# Patient Record
Sex: FEMALE | Race: WHITE | NOT HISPANIC OR LATINO
[De-identification: names, ages, dates, MRNs, and addresses within clinical notes are randomized per-mention and may not be internally consistent; named-entity substitution may affect disease eponyms.]

---

## 2019-07-24 ENCOUNTER — RECORD ABSTRACTING (OUTPATIENT)
Age: 34
End: 2019-07-24

## 2019-07-24 DIAGNOSIS — Z78.9 OTHER SPECIFIED HEALTH STATUS: ICD-10-CM

## 2019-07-24 DIAGNOSIS — Z82.49 FAMILY HISTORY OF ISCHEMIC HEART DISEASE AND OTHER DISEASES OF THE CIRCULATORY SYSTEM: ICD-10-CM

## 2019-07-24 DIAGNOSIS — Z86.59 PERSONAL HISTORY OF OTHER MENTAL AND BEHAVIORAL DISORDERS: ICD-10-CM

## 2019-07-24 PROBLEM — Z00.00 ENCOUNTER FOR PREVENTIVE HEALTH EXAMINATION: Status: ACTIVE | Noted: 2019-07-24

## 2020-12-04 ENCOUNTER — APPOINTMENT (OUTPATIENT)
Dept: OTOLARYNGOLOGY | Facility: CLINIC | Age: 35
End: 2020-12-04
Payer: COMMERCIAL

## 2020-12-04 VITALS
TEMPERATURE: 98 F | SYSTOLIC BLOOD PRESSURE: 134 MMHG | DIASTOLIC BLOOD PRESSURE: 88 MMHG | WEIGHT: 125 LBS | HEIGHT: 62 IN | BODY MASS INDEX: 23 KG/M2

## 2020-12-04 PROCEDURE — 99072 ADDL SUPL MATRL&STAF TM PHE: CPT

## 2020-12-04 PROCEDURE — 99214 OFFICE O/P EST MOD 30 MIN: CPT

## 2020-12-04 RX ORDER — ALPRAZOLAM 0.25 MG/1
0.25 TABLET ORAL
Refills: 0 | Status: COMPLETED | COMMUNITY
End: 2020-12-04

## 2020-12-04 NOTE — HISTORY OF PRESENT ILLNESS
[de-identified] : recurrent sinus pressure and congestion\par hx of allergic rhinits\par 2 or 3 times a year sinusitis\par no fever

## 2020-12-04 NOTE — REVIEW OF SYSTEMS
[Sneezing] : sneezing [Seasonal Allergies] : seasonal allergies [Post Nasal Drip] : post nasal drip [Ear Pain] : ear pain [Ear Itch] : ear itch [Nasal Congestion] : nasal congestion [Recurrent Sinus Infections] : recurrent sinus infections [Sinus Pain] : sinus pain [Sinus Pressure] : sinus pressure [Throat Clearing] : throat clearing [Anxiety] : anxiety [Patient Intake Form Reviewed] : Patient intake form was reviewed [de-identified] : headache

## 2020-12-04 NOTE — ASSESSMENT
[FreeTextEntry1] : symptoms related to mild sinusitis\par deviated septum and allergy aggravating factor\par augmentin\par dymista\par medrol dose pack\par will consider ct sinus\par f/u one month

## 2020-12-04 NOTE — PHYSICAL EXAM
[de-identified] : mild rhinits [Normal] : mucosa is normal [Midline] : trachea located in midline position

## 2021-03-07 ENCOUNTER — RX RENEWAL (OUTPATIENT)
Age: 36
End: 2021-03-07

## 2021-03-16 ENCOUNTER — APPOINTMENT (OUTPATIENT)
Dept: OTOLARYNGOLOGY | Facility: CLINIC | Age: 36
End: 2021-03-16
Payer: COMMERCIAL

## 2021-03-16 VITALS
DIASTOLIC BLOOD PRESSURE: 82 MMHG | SYSTOLIC BLOOD PRESSURE: 126 MMHG | TEMPERATURE: 98.1 F | HEART RATE: 80 BPM | BODY MASS INDEX: 23 KG/M2 | WEIGHT: 125 LBS | HEIGHT: 62 IN

## 2021-03-16 DIAGNOSIS — H10.9 UNSPECIFIED CONJUNCTIVITIS: ICD-10-CM

## 2021-03-16 PROCEDURE — 99213 OFFICE O/P EST LOW 20 MIN: CPT

## 2021-03-16 PROCEDURE — 99072 ADDL SUPL MATRL&STAF TM PHE: CPT

## 2021-03-16 NOTE — HISTORY OF PRESENT ILLNESS
[de-identified] : recurrent sinus pressure and congestion\par hx of allergic rhinits\par 2 or 3 times a year sinusitis\par no fever

## 2021-03-16 NOTE — PHYSICAL EXAM
[de-identified] : mild rhinits [Normal] : mucosa is normal [Midline] : trachea located in midline position

## 2021-03-16 NOTE — ASSESSMENT
[FreeTextEntry1] : HUMIDIFIER\par ZYRTEC\par IF SYMPTOMS INCREASE CONSIDER CEFDINIR\par MEDROL DOSE PACK\par F/U 2 MONTHS\par PT SAYS HAS HAD MULTIPLE CT SINUS IN THE PAST WITH MINIMAL INFLAMMATION\par F/U ALLERGY/ IMMUNOLOGY

## 2021-04-14 ENCOUNTER — APPOINTMENT (OUTPATIENT)
Dept: OTOLARYNGOLOGY | Facility: CLINIC | Age: 36
End: 2021-04-14

## 2021-06-09 ENCOUNTER — RX RENEWAL (OUTPATIENT)
Age: 36
End: 2021-06-09

## 2021-07-02 ENCOUNTER — RX RENEWAL (OUTPATIENT)
Age: 36
End: 2021-07-02

## 2021-07-26 ENCOUNTER — RX RENEWAL (OUTPATIENT)
Age: 36
End: 2021-07-26

## 2021-07-26 ENCOUNTER — APPOINTMENT (OUTPATIENT)
Dept: PEDIATRIC ALLERGY IMMUNOLOGY | Facility: CLINIC | Age: 36
End: 2021-07-26
Payer: COMMERCIAL

## 2021-07-26 PROCEDURE — 95004 PERQ TESTS W/ALRGNC XTRCS: CPT

## 2021-07-26 PROCEDURE — 99204 OFFICE O/P NEW MOD 45 MIN: CPT | Mod: 25

## 2021-07-26 PROCEDURE — 99072 ADDL SUPL MATRL&STAF TM PHE: CPT

## 2021-07-26 RX ORDER — METHYLPREDNISOLONE 4 MG/1
4 TABLET ORAL
Qty: 1 | Refills: 1 | Status: DISCONTINUED | COMMUNITY
Start: 2021-03-16 | End: 2021-07-26

## 2021-07-26 RX ORDER — ESCITALOPRAM OXALATE 5 MG/1
TABLET, FILM COATED ORAL
Refills: 0 | Status: ACTIVE | COMMUNITY

## 2021-07-26 RX ORDER — AMOXICILLIN AND CLAVULANATE POTASSIUM 875; 125 MG/1; MG/1
875-125 TABLET, COATED ORAL
Qty: 14 | Refills: 0 | Status: DISCONTINUED | COMMUNITY
Start: 2020-12-04 | End: 2021-07-26

## 2021-07-26 RX ORDER — ESTRADIOL VALERATE AND ESTRADIOL VALERATE/DIENOGEST 3-2-1(28)
3/2-2/2-3/1 KIT ORAL
Refills: 0 | Status: ACTIVE | COMMUNITY

## 2021-07-26 RX ORDER — CEFDINIR 300 MG/1
300 CAPSULE ORAL
Qty: 10 | Refills: 1 | Status: DISCONTINUED | COMMUNITY
Start: 2021-03-16 | End: 2021-07-26

## 2021-07-26 RX ORDER — ALBUTEROL SULFATE 108 UG/1
108 (90 BASE) AEROSOL, METERED RESPIRATORY (INHALATION)
Refills: 0 | Status: ACTIVE | COMMUNITY

## 2021-07-26 RX ORDER — METHYLPREDNISOLONE 4 MG/1
4 TABLET ORAL
Qty: 1 | Refills: 1 | Status: DISCONTINUED | COMMUNITY
Start: 2020-12-04 | End: 2021-07-26

## 2021-07-26 NOTE — REVIEW OF SYSTEMS
[Eye Redness] : redness [Puffy Eyelids] : puffy ~T eyelids [Swollen Eyelids] : ~T ~L swollen eyelids [Rhinorrhea] : rhinorrhea [Nasal Congestion] : nasal congestion [Post Nasal Drip] : post nasal drip [Sneezing] : sneezing [Recurrent Sinus Infections] : recurrent sinus infections [Recurrent Throat Infections] : no recurrence of throat infections [Recurrent Bronchitis] : no recurrent bronchitis [Recurrent Ear Infections] : no recurrence or ear infections [Recurrent Skin Infections] : no recurrent skin infections [Recurrent Pneumonia] : no ~T recurrent pneumonia [Nl] : Integumentary

## 2021-07-26 NOTE — REASON FOR VISIT
[Initial Evaluation] : an initial evaluation of [Allergy Evaluation/ Skin Testing] : allergy evaluation and or skin testing [Congestion] : congestion [Itchy Eyes] : itchy eyes [Red Eyes] : red eyes [Asthma] : asthma

## 2021-07-26 NOTE — SOCIAL HISTORY
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

## 2021-07-26 NOTE — PHYSICAL EXAM
[Alert] : alert [Well Nourished] : well nourished [Normal TMs] : both tympanic membranes were normal [Pale mucosa] : no pale mucosa [Boggy Nasal Turbinates] : no boggy and/or pale nasal turbinates [Pharyngeal erythema] : no pharyngeal erythema [Posterior Pharyngeal Cobblestoning] : no posterior pharyngeal cobblestoning [Clear Rhinorrhea] : no clear rhinorrhea was seen [No Neck Mass] : no neck mass was observed [Normal Rate and Effort] : normal respiratory rhythm and effort [Wheezing] : no wheezing was heard [Normal Rate] : heart rate was normal  [Normal S1, S2] : normal S1 and S2 [Normal Cervical Lymph Nodes] : cervical [Skin Intact] : skin intact

## 2021-07-26 NOTE — ASSESSMENT
[FreeTextEntry1] : 35 yr old with long history of CRS, AR s/p AIT now with slowly increasing compalints of oral steroid and antibiotic use\par \par Skin test today show only positive test to mite\par Pt would want to consider IT but insurance co pay is too high\par \par Will re-send immunological eval and consider repeat Pneumovax if titers low\par Will have to review old recored\par \par Follow up fall 21.\par \par Total MD time spent on this encounter was 45-59 minutes.  This includes time devoted to preparing to see the patient with review of previous medical record, obtaining medical history, performing physical exam, counseling and patient education with patient and family, ordering medications and lab studies, documentation in the medical record and coordination of care.\par

## 2021-07-28 ENCOUNTER — NON-APPOINTMENT (OUTPATIENT)
Age: 36
End: 2021-07-28

## 2021-08-24 ENCOUNTER — APPOINTMENT (OUTPATIENT)
Dept: OTOLARYNGOLOGY | Facility: CLINIC | Age: 36
End: 2021-08-24
Payer: COMMERCIAL

## 2021-08-24 VITALS
HEART RATE: 83 BPM | BODY MASS INDEX: 22.08 KG/M2 | HEIGHT: 62 IN | SYSTOLIC BLOOD PRESSURE: 125 MMHG | DIASTOLIC BLOOD PRESSURE: 79 MMHG | WEIGHT: 120 LBS

## 2021-08-24 DIAGNOSIS — R42 DIZZINESS AND GIDDINESS: ICD-10-CM

## 2021-08-24 PROCEDURE — 99214 OFFICE O/P EST MOD 30 MIN: CPT

## 2021-08-24 PROCEDURE — 92557 COMPREHENSIVE HEARING TEST: CPT

## 2021-08-24 PROCEDURE — 92570 ACOUSTIC IMMITANCE TESTING: CPT

## 2021-08-24 RX ORDER — AZELASTINE HYDROCHLORIDE AND FLUTICASONE PROPIONATE 137; 50 UG/1; UG/1
137-50 SPRAY, METERED NASAL
Qty: 1 | Refills: 2 | Status: DISCONTINUED | COMMUNITY
Start: 2020-12-04 | End: 2021-08-24

## 2021-08-24 NOTE — REVIEW OF SYSTEMS
[Seasonal Allergies] : seasonal allergies [As Noted in HPI] : as noted in HPI [Negative] : Head and Neck

## 2021-08-24 NOTE — DATA REVIEWED
[de-identified] : C/o: episode of vertigo\par -Type A tymps AU\par -Ipsilateral acoustic reflexes present .5-2kHz (acoustic reflex decay neg at 1kHz) AU\par -Results obtained via insert earphones revealed hearing -8000 Hz, AU\par  Rec: 1)ENT f/u 2)Re-eval/ further testing as per MD

## 2021-08-24 NOTE — HISTORY OF PRESENT ILLNESS
[de-identified] : 35 yr old female w episode of vertigo 8/21/2021 when lying in bed, rolling to her right.  Lasted less than a minute. Nauseous most of that day.  Vertigo hasn't recurred, but she still feels "off"\par -aural fullness, tinnitus\par +childhood hx of otitis s/p BMT twice\par -noise exp\par +head traum w LOC when young (sports injuries)\par +FH mother vertigo\par

## 2021-08-24 NOTE — PHYSICAL EXAM
[Midline] : trachea located in midline position [Normal] : no nystagmus [] : Tandem Romberg test is negative

## 2021-08-25 ENCOUNTER — RX CHANGE (OUTPATIENT)
Age: 36
End: 2021-08-25

## 2021-08-25 RX ORDER — HYDROXYZINE HYDROCHLORIDE 25 MG/1
25 TABLET ORAL
Qty: 60 | Refills: 1 | Status: DISCONTINUED | COMMUNITY
Start: 2021-07-26 | End: 2021-08-25

## 2021-09-09 ENCOUNTER — RX RENEWAL (OUTPATIENT)
Age: 36
End: 2021-09-09

## 2021-10-03 ENCOUNTER — RX CHANGE (OUTPATIENT)
Age: 36
End: 2021-10-03

## 2021-10-03 RX ORDER — HYDROXYZINE HYDROCHLORIDE 25 MG/1
25 TABLET ORAL
Qty: 60 | Refills: 1 | Status: DISCONTINUED | COMMUNITY
Start: 2021-08-25 | End: 2021-10-03

## 2021-10-18 ENCOUNTER — RX CHANGE (OUTPATIENT)
Age: 36
End: 2021-10-18

## 2021-10-18 RX ORDER — HYDROXYZINE HYDROCHLORIDE 25 MG/1
25 TABLET ORAL
Qty: 60 | Refills: 1 | Status: DISCONTINUED | COMMUNITY
Start: 2021-10-03 | End: 2021-10-18

## 2021-11-02 ENCOUNTER — RX CHANGE (OUTPATIENT)
Age: 36
End: 2021-11-02

## 2021-11-02 RX ORDER — HYDROXYZINE HYDROCHLORIDE 25 MG/1
25 TABLET ORAL
Qty: 120 | Refills: 1 | Status: DISCONTINUED | COMMUNITY
Start: 2021-10-18 | End: 2021-11-02

## 2021-11-30 ENCOUNTER — RX RENEWAL (OUTPATIENT)
Age: 36
End: 2021-11-30

## 2022-01-14 ENCOUNTER — APPOINTMENT (OUTPATIENT)
Dept: OTOLARYNGOLOGY | Facility: CLINIC | Age: 37
End: 2022-01-14
Payer: COMMERCIAL

## 2022-01-14 VITALS
SYSTOLIC BLOOD PRESSURE: 127 MMHG | WEIGHT: 120 LBS | BODY MASS INDEX: 22.08 KG/M2 | HEART RATE: 78 BPM | DIASTOLIC BLOOD PRESSURE: 78 MMHG | HEIGHT: 62 IN

## 2022-01-14 DIAGNOSIS — J32.9 CHRONIC SINUSITIS, UNSPECIFIED: ICD-10-CM

## 2022-01-14 PROCEDURE — 99213 OFFICE O/P EST LOW 20 MIN: CPT

## 2022-01-14 NOTE — HISTORY OF PRESENT ILLNESS
[de-identified] : recurrent sinus pressure and congestion/ FEELING CONGESTED\par hx of allergic rhinits\par 2 or 3 times a year sinusitis\par no fever

## 2022-01-14 NOTE — PHYSICAL EXAM
[de-identified] : mild rhinits [Normal] : mucosa is normal [Midline] : trachea located in midline position

## 2022-01-18 ENCOUNTER — RX CHANGE (OUTPATIENT)
Age: 37
End: 2022-01-18

## 2022-01-18 RX ORDER — HYDROXYZINE HYDROCHLORIDE 25 MG/1
25 TABLET ORAL
Qty: 120 | Refills: 1 | Status: DISCONTINUED | COMMUNITY
Start: 2021-11-02 | End: 2022-01-18

## 2022-01-25 ENCOUNTER — RX RENEWAL (OUTPATIENT)
Age: 37
End: 2022-01-25

## 2022-02-14 ENCOUNTER — APPOINTMENT (OUTPATIENT)
Dept: PEDIATRIC ALLERGY IMMUNOLOGY | Facility: CLINIC | Age: 37
End: 2022-02-14
Payer: COMMERCIAL

## 2022-02-14 VITALS
WEIGHT: 137 LBS | DIASTOLIC BLOOD PRESSURE: 80 MMHG | TEMPERATURE: 97.5 F | RESPIRATION RATE: 16 BRPM | HEIGHT: 62 IN | BODY MASS INDEX: 25.21 KG/M2 | OXYGEN SATURATION: 99 % | HEART RATE: 72 BPM | SYSTOLIC BLOOD PRESSURE: 122 MMHG

## 2022-02-14 DIAGNOSIS — J31.0 CHRONIC RHINITIS: ICD-10-CM

## 2022-02-14 DIAGNOSIS — J45.909 UNSPECIFIED ASTHMA, UNCOMPLICATED: ICD-10-CM

## 2022-02-14 PROCEDURE — 99214 OFFICE O/P EST MOD 30 MIN: CPT

## 2022-02-14 RX ORDER — IPRATROPIUM BROMIDE 42 UG/1
0.06 SPRAY NASAL 3 TIMES DAILY
Qty: 1 | Refills: 5 | Status: DISCONTINUED | COMMUNITY
Start: 2021-07-26 | End: 2022-02-14

## 2022-02-14 RX ORDER — AMOXICILLIN AND CLAVULANATE POTASSIUM 875; 125 MG/1; MG/1
875-125 TABLET, COATED ORAL
Qty: 14 | Refills: 0 | Status: DISCONTINUED | COMMUNITY
Start: 2022-01-14 | End: 2022-02-14

## 2022-02-14 RX ORDER — METHYLPREDNISOLONE 4 MG/1
4 TABLET ORAL
Qty: 1 | Refills: 0 | Status: DISCONTINUED | COMMUNITY
Start: 2022-01-14 | End: 2022-02-14

## 2022-02-14 NOTE — ASSESSMENT
[FreeTextEntry1] : 36 yr old with recurrent sinusitis, AR to mite, mild intermittent asthma and anxiety\par \par Pt wants to consider IT to mite but is worried about cost\par Recent sinus CT not done secondary to cost issues - ?? candidate for surgical intervention\par Pt may be a candidate for repeat Pneumovax 23 as it previously helped reduce infection rate but pt is not sure when last given - will let me know\par Will add AM zyrtec 10 mg q AM along with Atarax 25 mg qhs\par Continue nasal spray combo\par Will refill all meds\par \par Total MD time spent on this encounter was 30 minutes.  This includes time devoted to preparing to see the patient with review of previous medical record, obtaining medical history, performing physical exam, counseling and patient education with patient and family, ordering medications and lab studies, documentation in the medical record and coordination of care.\par \par \par

## 2022-02-14 NOTE — REVIEW OF SYSTEMS
[Rhinorrhea] : rhinorrhea [Nasal Dryness] : dryness of the nose [Nasal Congestion] : nasal congestion [Nasal Itching] : nasal itching [Post Nasal Drip] : post nasal drip [Sneezing] : sneezing [Recurrent Sinus Infections] : recurrent sinus infections [Recurrent Throat Infections] : no recurrence of throat infections [Recurrent Bronchitis] : no recurrent bronchitis [Recurrent Ear Infections] : no recurrence or ear infections [Recurrent Skin Infections] : no recurrent skin infections [Nl] : Integumentary

## 2022-02-14 NOTE — HISTORY OF PRESENT ILLNESS
[de-identified] : 36 yr old with long history of AR and CRS - was followed by Dr. Lara and Dr. Prieto - now seeking to switch allergy care. Pt was on IT as teen but stopped and college and over the years she has noted increase complaints of nasal congestions, post nasal drip, itchy watery eyes and rhinitis. Sinus infections are noted 3x/year and treated with antibiotics and steroids. \par She uses Atarax 25 mg qhs, Zyrtec PRN, ipratropium 2s bid and combo of azelastine and fluticasone qd-bid.  All of these seem to help. She is interested in starting IT again but her insurance co pay is too high to make it manageable. \par \par She had an immunological eval years ago with Dr. Lara and was given Pneumococcal vaccine but does not remember when. She also had nasal endoscopies with Dr. Prieto but does not remember the last one along with several sinus CT scans but none recently. She recalls the CT scans being normal. \par \par She is now here for follow up - she given an interim history of at least 2-3 episodes of sinusitis/year treated with oral antibx and oral steroids.  She has not had a recent CT scan and does not want to secondary to cost.  She was on shots from age [age] years and wants to consider but states she cannot afford the co-pay.  Last ST 7/11 was positive only for mite\par Pt recently had immunology eval repeated - Immunocaps were positive only for mite. IgG and IgA were normal with slightly decreased IgM. Tetanus titers were normal. [facility] only did Pneumococcal 14 serotypes and some were elevated and some were decrease. She does recall Dr. Lara giving her a Pneumovax (got at [facility]) but does not remember when. She felt it had helped decrease the number of infections but it may have been over 5 years ago. She is currently suing Atarax qhs but does not take a day time H1 blocker and use Flonase and azelastine nasal spray separately

## 2022-02-14 NOTE — SOCIAL HISTORY
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

## 2022-02-14 NOTE — REASON FOR VISIT
[Routine Follow-Up] : a routine follow-up visit for [Congestion] : congestion [Itchy Eyes] : itchy eyes [Red Eyes] : red eyes [Asthma] : asthma

## 2022-02-16 ENCOUNTER — RX RENEWAL (OUTPATIENT)
Age: 37
End: 2022-02-16

## 2022-03-16 ENCOUNTER — RX CHANGE (OUTPATIENT)
Age: 37
End: 2022-03-16

## 2022-03-16 RX ORDER — HYDROXYZINE HYDROCHLORIDE 25 MG/1
25 TABLET ORAL
Qty: 120 | Refills: 1 | Status: DISCONTINUED | COMMUNITY
Start: 2022-01-18 | End: 2022-03-16

## 2022-03-28 ENCOUNTER — RX RENEWAL (OUTPATIENT)
Age: 37
End: 2022-03-28

## 2022-05-26 ENCOUNTER — RX CHANGE (OUTPATIENT)
Age: 37
End: 2022-05-26

## 2022-05-26 RX ORDER — HYDROXYZINE HYDROCHLORIDE 25 MG/1
25 TABLET ORAL
Qty: 120 | Refills: 1 | Status: DISCONTINUED | COMMUNITY
Start: 2022-03-16 | End: 2022-05-26

## 2022-05-27 ENCOUNTER — RX RENEWAL (OUTPATIENT)
Age: 37
End: 2022-05-27

## 2022-06-08 ENCOUNTER — RX CHANGE (OUTPATIENT)
Age: 37
End: 2022-06-08

## 2022-06-08 RX ORDER — HYDROXYZINE HYDROCHLORIDE 25 MG/1
25 TABLET ORAL
Qty: 120 | Refills: 1 | Status: DISCONTINUED | COMMUNITY
Start: 2022-05-26 | End: 2022-06-08

## 2022-08-26 ENCOUNTER — RX RENEWAL (OUTPATIENT)
Age: 37
End: 2022-08-26

## 2022-10-19 ENCOUNTER — NON-APPOINTMENT (OUTPATIENT)
Age: 37
End: 2022-10-19

## 2022-11-15 ENCOUNTER — RX RENEWAL (OUTPATIENT)
Age: 37
End: 2022-11-15

## 2022-11-29 ENCOUNTER — RX CHANGE (OUTPATIENT)
Age: 37
End: 2022-11-29

## 2022-12-20 ENCOUNTER — APPOINTMENT (OUTPATIENT)
Dept: PEDIATRIC ALLERGY IMMUNOLOGY | Facility: CLINIC | Age: 37
End: 2022-12-20

## 2022-12-20 VITALS
HEART RATE: 75 BPM | TEMPERATURE: 98.7 F | DIASTOLIC BLOOD PRESSURE: 84 MMHG | SYSTOLIC BLOOD PRESSURE: 122 MMHG | OXYGEN SATURATION: 96 %

## 2022-12-20 PROCEDURE — 99214 OFFICE O/P EST MOD 30 MIN: CPT

## 2022-12-20 RX ORDER — HYDROXYZINE HYDROCHLORIDE 25 MG/1
25 TABLET ORAL
Refills: 0 | Status: COMPLETED | COMMUNITY
End: 2022-12-20

## 2022-12-20 RX ORDER — ALPRAZOLAM 0.25 MG/1
0.25 TABLET ORAL
Refills: 0 | Status: ACTIVE | COMMUNITY

## 2022-12-20 RX ORDER — OLOPATADINE HCL 1 MG/ML
0.1 SOLUTION/ DROPS OPHTHALMIC
Qty: 3 | Refills: 3 | Status: ACTIVE | COMMUNITY
Start: 2021-03-16 | End: 1900-01-01

## 2022-12-20 NOTE — REASON FOR VISIT
[Routine Follow-Up] : a routine follow-up visit for [Allergic Rhinitis] : allergic rhinitis [Congestion] : congestion

## 2023-01-05 LAB
CD16+CD56+ CELLS # BLD: 173 CELLS/UL
CD16+CD56+ CELLS NFR BLD: 7 %
CD19 CELLS NFR BLD: 380 CELLS/UL
CD3 CELLS # BLD: 1741 CELLS/UL
CD3 CELLS NFR BLD: 76 %
CD3+CD4+ CELLS # BLD: 1261 CELLS/UL
CD3+CD4+ CELLS NFR BLD: 56 %
CD3+CD4+ CELLS/CD3+CD8+ CLL SPEC: 3.16 RATIO
CD3+CD8+ CELLS # SPEC: 399 CELLS/UL
CD3+CD8+ CELLS NFR BLD: 18 %
CELLS.CD3-CD19+/CELLS IN BLOOD: 16 %
DEPRECATED KAPPA LC FREE/LAMBDA SER: 1.39 RATIO
IGA SER QL IEP: 185 MG/DL
IGG SER QL IEP: 1344 MG/DL
IGM SER QL IEP: 38 MG/DL
KAPPA LC CSF-MCNC: 0.9 MG/DL
KAPPA LC SERPL-MCNC: 1.25 MG/DL

## 2023-01-07 ENCOUNTER — NON-APPOINTMENT (OUTPATIENT)
Age: 38
End: 2023-01-07

## 2023-01-10 LAB
DEPRECATED S PNEUM 1 IGG SER-MCNC: 4.8 MCG/ML
DEPRECATED S PNEUM12 AB SER-ACNC: <0.4 MCG/ML
DEPRECATED S PNEUM14 AB SER-ACNC: 3.2 MCG/ML
DEPRECATED S PNEUM17 IGG SER IA-MCNC: 5.6 MCG/ML
DEPRECATED S PNEUM18 IGG SER IA-MCNC: <0.4 MCG/ML
DEPRECATED S PNEUM19 IGG SER-MCNC: 0.8 MCG/ML
DEPRECATED S PNEUM19 IGG SER-MCNC: 3.5 MCG/ML
DEPRECATED S PNEUM2 IGG SER-MCNC: 0.4 MCG/ML
DEPRECATED S PNEUM20 IGG SER-MCNC: <0.4 MCG/ML
DEPRECATED S PNEUM22 IGG SER-MCNC: 2.3 MCG/ML
DEPRECATED S PNEUM23 AB SER-ACNC: 2.9 MCG/ML
DEPRECATED S PNEUM3 AB SER-ACNC: 1 MCG/ML
DEPRECATED S PNEUM34 IGG SER-MCNC: 1.1 MCG/ML
DEPRECATED S PNEUM4 AB SER-ACNC: <0.4 MCG/ML
DEPRECATED S PNEUM5 IGG SER-MCNC: 0.8 MCG/ML
DEPRECATED S PNEUM6 IGG SER-MCNC: 17.7 MCG/ML
DEPRECATED S PNEUM7 IGG SER-ACNC: 0.7 MCG/ML
DEPRECATED S PNEUM8 AB SER-ACNC: 3.1 MCG/ML
DEPRECATED S PNEUM9 AB SER-ACNC: NORMAL MCG/ML
DEPRECATED S PNEUM9 IGG SER-MCNC: 0.5 MCG/ML
STREPTOCOCCUS PNEUMONIAE SEROTYPE 11A: 0.8 MCG/ML
STREPTOCOCCUS PNEUMONIAE SEROTYPE 15B: 16.2 MCG/ML
STREPTOCOCCUS PNEUMONIAE SEROTYPE 33F: 6.1 MCG/ML

## 2023-01-18 RX ORDER — IPRATROPIUM BROMIDE 21 UG/1
0.03 SPRAY NASAL
Qty: 3 | Refills: 3 | Status: ACTIVE | COMMUNITY
Start: 2021-05-18 | End: 1900-01-01

## 2023-01-18 NOTE — PHYSICAL EXAM
[Alert] : alert [Well Nourished] : well nourished [Healthy Appearance] : healthy appearance [No Acute Distress] : no acute distress [Well Developed] : well developed [Normal Voice/Communication] : normal voice communication [Normal Pupil & Iris Size/Symmetry] : normal pupil and iris size and symmetry [No Discharge] : no discharge [No Photophobia] : no photophobia [Sclera Not Icteric] : sclera not icteric [Normal TMs] : both tympanic membranes were normal [Normal Nasal Mucosa] : the nasal mucosa was normal [Normal Lips/Tongue] : the lips and tongue were normal [Normal Outer Ear/Nose] : the ears and nose were normal in appearance [No Nasal Discharge] : no nasal discharge [Normal Tonsils] : normal tonsils [No Thrush] : no thrush [No Neck Mass] : no neck mass was observed [Normal Rate and Effort] : normal respiratory rhythm and effort [Bilateral Audible Breath Sounds] : bilateral audible breath sounds [Normal Rate] : heart rate was normal  [Normal Cervical Lymph Nodes] : cervical [Skin Intact] : skin intact  [No Rash] : no rash [Normal Mood] : mood was normal [Normal Affect] : affect was normal [Judgment and Insight Age Appropriate] : judgement and insight is age appropriate [Alert, Awake, Oriented as Age-Appropriate] : alert, awake, oriented as age appropriate [Boggy Nasal Turbinates] : no boggy and/or pale nasal turbinates [Posterior Pharyngeal Cobblestoning] : no posterior pharyngeal cobblestoning

## 2023-01-18 NOTE — SOCIAL HISTORY
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

## 2023-01-18 NOTE — REVIEW OF SYSTEMS
[Nasal Congestion] : nasal congestion [Post Nasal Drip] : post nasal drip [Recurrent Sinus Infections] : recurrent sinus infections [Nl] : Integumentary [Immunizations are up to date] : Immunizations are up to date [Recurrent Throat Infections] : no recurrence of throat infections [Recurrent Bronchitis] : no recurrent bronchitis [Recurrent Ear Infections] : no recurrence or ear infections [Recurrent Skin Infections] : no recurrent skin infections [Recurrent Pneumonia] : no ~T recurrent pneumonia

## 2023-01-18 NOTE — ASSESSMENT
[FreeTextEntry1] : 37 y.o long history of AR, CRS , and recurrent acute sinusitis formally on AIT with Dr. Villafuerte presents with persistent nasal congestions, acute sinusitis and ?? sub-optimal pneumococcal titers who is interested in restarting AIT.\par \par Will repeat immune evaluation with pneumococcal titers (only 14 were done the last time), immunoglobulins and T-cell subset panel. If low titers will recommend Pneumovax 23 and check post vax titers\par \par Patient advised to investigate out of pocket expenses with AIT before serum made. - would use A= Df/Dp only \par \par Suggest:\par - Continue  Atarax 25 mg qhs, Allegra 180mg PRN, ipratropium 2s bid and  azelastine and fluticasone nasal sprays daily\par - F/u with ENT about CT sinus - praevious study done in Dr. Prieto office - can repeat there\par \par Patient was seen with ARIK Lopez\par \par Total MD time spent on this encounter was 30 minutes.  This includes time devoted to preparing to see the patient with review of previous medical record, obtaining medical history, performing physical exam, counseling and patient education with patient and family, ordering medications and lab studies, documentation in the medical record and coordination of care.\par \par

## 2023-01-18 NOTE — HISTORY OF PRESENT ILLNESS
[de-identified] : 37 yr old lasts een 2/14/22 with long history of AR and CRS - was followed by Dr. Villafuerte and Dr. Prieto - now seeking to switch allergy care. Pt was on IT(mites/mold) as teen but stopped and college and over the years she has noted increase complaints of nasal congestions, post nasal drip, itchy watery eyes and rhinitis. Sinus infections are noted 3x/year and treated with antibiotics and steroids. \par \par Previous immunological eval years ago with Dr. Villafuerte and was given Pneumococcal vaccine but does not remember when. She also had nasal endoscopies with Dr. Prieto but does not remember the last one along with several sinus CT scans but none recently. She recalls the CT scans being normal. \par \par Last visit she claimed at least 2-3 episodes of sinusitis/year treated with oral antibx and oral steroids despite being on Atarax 25 mg qhs, Zyrtec PRN, ipratropium 2s bid and  azelastine and fluticasone nasal sprays daily . She has not had a recent CT scan and does not want to secondary to cost. She was on shots from age [age] years which helped and wants to consider but states she cannot afford the co-pay. Last ST 7/11 was positive only for mite\par \par Repeat immune workup/ImmunoCAP 2/14/22 done at [facility]- Immunocaps were positive only for mite. IgG and IgA were normal with slightly decreased IgM. Tetanus titers were normal. [facility] only did Pneumococcal 14 serotypes and some were elevated and some were decreased. She does recall Dr. Villafuerte giving her a Pneumovax (got at [facility]) but does not remember when. She felt it had helped decrease the number of infections but it may have been over 5 years ago. \par \par Patient now back 12/20/22 and allergies continue to flare despite use of Atarax 25 mg qhs, Allegra 180 PRN, ipratropium 2s bid and azelastine and fluticasone nasal sprays daily. Her biggest issue is nasal congestion. She also had 2-3 sinus infections this year the last of which was one month ago and treated with antibiotic and steroid. She now notices typically steroid is also needed during acute sinusitis episodes. She is interested in getting another CT sinus. She also has new insurance and would like to start AIT as she believes it is covered. She is also willing to get Pneumovax 23 if it will reduce amount of sinus infections she gets yearly.

## 2023-01-23 RX ORDER — FLUTICASONE PROPIONATE 50 UG/1
50 SPRAY, METERED NASAL DAILY
Qty: 1 | Refills: 3 | Status: ACTIVE | COMMUNITY
Start: 2023-01-23 | End: 1900-01-01

## 2023-01-24 ENCOUNTER — APPOINTMENT (OUTPATIENT)
Dept: PEDIATRIC ALLERGY IMMUNOLOGY | Facility: CLINIC | Age: 38
End: 2023-01-24
Payer: COMMERCIAL

## 2023-01-24 VITALS — TEMPERATURE: 98.1 F

## 2023-01-24 DIAGNOSIS — Z23 ENCOUNTER FOR IMMUNIZATION: ICD-10-CM

## 2023-01-24 PROCEDURE — 90471 IMMUNIZATION ADMIN: CPT

## 2023-01-24 PROCEDURE — 90732 PPSV23 VACC 2 YRS+ SUBQ/IM: CPT

## 2023-02-01 ENCOUNTER — APPOINTMENT (OUTPATIENT)
Dept: PEDIATRIC ALLERGY IMMUNOLOGY | Facility: CLINIC | Age: 38
End: 2023-02-01
Payer: COMMERCIAL

## 2023-02-01 PROCEDURE — 95165 ANTIGEN THERAPY SERVICES: CPT

## 2023-02-08 ENCOUNTER — APPOINTMENT (OUTPATIENT)
Dept: PEDIATRIC ALLERGY IMMUNOLOGY | Facility: CLINIC | Age: 38
End: 2023-02-08
Payer: COMMERCIAL

## 2023-02-08 PROCEDURE — 95165 ANTIGEN THERAPY SERVICES: CPT

## 2023-02-14 ENCOUNTER — APPOINTMENT (OUTPATIENT)
Dept: PEDIATRIC ALLERGY IMMUNOLOGY | Facility: CLINIC | Age: 38
End: 2023-02-14
Payer: COMMERCIAL

## 2023-02-14 PROCEDURE — 95115 IMMUNOTHERAPY ONE INJECTION: CPT

## 2023-02-15 ENCOUNTER — APPOINTMENT (OUTPATIENT)
Dept: PEDIATRIC ALLERGY IMMUNOLOGY | Facility: CLINIC | Age: 38
End: 2023-02-15
Payer: COMMERCIAL

## 2023-02-15 PROCEDURE — 95165 ANTIGEN THERAPY SERVICES: CPT

## 2023-02-17 ENCOUNTER — RX CHANGE (OUTPATIENT)
Age: 38
End: 2023-02-17

## 2023-02-21 ENCOUNTER — APPOINTMENT (OUTPATIENT)
Dept: PEDIATRIC ALLERGY IMMUNOLOGY | Facility: CLINIC | Age: 38
End: 2023-02-21
Payer: COMMERCIAL

## 2023-02-21 PROCEDURE — 95115 IMMUNOTHERAPY ONE INJECTION: CPT

## 2023-02-22 ENCOUNTER — APPOINTMENT (OUTPATIENT)
Dept: PEDIATRIC ALLERGY IMMUNOLOGY | Facility: CLINIC | Age: 38
End: 2023-02-22
Payer: COMMERCIAL

## 2023-02-22 PROCEDURE — 95165 ANTIGEN THERAPY SERVICES: CPT

## 2023-02-28 ENCOUNTER — APPOINTMENT (OUTPATIENT)
Dept: PEDIATRIC ALLERGY IMMUNOLOGY | Facility: CLINIC | Age: 38
End: 2023-02-28
Payer: COMMERCIAL

## 2023-02-28 PROCEDURE — 99213 OFFICE O/P EST LOW 20 MIN: CPT

## 2023-02-28 PROCEDURE — 95115 IMMUNOTHERAPY ONE INJECTION: CPT

## 2023-02-28 RX ORDER — AZELASTINE HYDROCHLORIDE 205.5 UG/1
0.15 SPRAY, METERED NASAL
Qty: 3 | Refills: 1 | Status: COMPLETED | COMMUNITY
Start: 2022-02-14 | End: 2023-02-28

## 2023-02-28 RX ORDER — AZELASTINE HYDROCHLORIDE AND FLUTICASONE PROPIONATE 137; 50 UG/1; UG/1
137-50 SPRAY, METERED NASAL
Qty: 3 | Refills: 3 | Status: COMPLETED | COMMUNITY
Start: 2021-07-26 | End: 2023-02-28

## 2023-02-28 NOTE — HISTORY OF PRESENT ILLNESS
[de-identified] : 37 yr old lasts een 2/14/22 with long history of AR and CRS - was followed by Dr. Villafuerte and Dr. Prieto. Pt was on IT(mites/mold) as teen but stopped and college and over the years she has noted increase complaints of nasal congestions, post nasal drip, itchy watery eyes and rhinitis. Sinus infections are noted 3x/year and treated with antibiotics and steroids. \par \par Previous immunological eval years ago with Dr. Villafuerte and was given Pneumococcal vaccine but does not remember when. She also had nasal endoscopies with Dr. Prieto but does not remember the last one along with several sinus CT scans but none recently. She recalls the CT scans being normal. \par \par Patient claims she gets at least 2-3 episodes of sinusitis/year treated with oral antibx and oral steroids despite being on Atarax 25 mg qhs, Zyrtec PRN, ipratropium 2s bid and azelastine and fluticasone nasal sprays daily. She has not had a recent CT scan and does not want to secondary to cost. She was on shots from age [age] years which helped and wants to consider but states she cannot afford the co-pay. Last ST 7/11 was positive only for mite\par \par Repeat immune workup/ImmunoCAP 2/14/22 done at [facility]- ImmunoCAP were positive only for mite. IgG and IgA were normal with slightly decreased IgM. Tetanus titers were normal. [facility] only did Pneumococcal 14 serotypes and some were elevated and some were decreased. She does recall Dr. Villafuerte giving her a Pneumovax (got at [facility]) but does not remember when. She felt it had helped decrease the number of infections but it may have been over 5 years ago. Pneumovax 23 given 1/24/23 and she will be going for repeat titers.\par \par Last visit allergies continue to flare despite use of Atarax 25 mg qhs, Allegra 180 PRN, ipratropium 2s bid and azelastine and fluticasone nasal sprays daily. Her biggest issue is nasal congestion. She also had 2-3 sinus infections this year the last of which was one month ago and treated with antibiotic and steroid. She now notices typically steroid is also needed during acute sinusitis episodes.She is interested in AIT. AIT started 2/14/23\par \par She presents today for AIT but not feeling well. Over past 5 days she's had ethmoidal sinus pressure and pain with increase in nasal congestion. She get very minimal amounts of yellow mucous from nose. Symptoms are worsening in severity since inception. She has been consistently taking Continue Atarax 25 mg qhs,  ipratropium 2s bid, azelastine and fluticasone nasal sprays daily. Since her symptoms flared she's also added sinus rinse. She denies any fevers or chills. She is afraid symptoms will exacerbate and lead to missed days of work which she can not take.

## 2023-02-28 NOTE — ASSESSMENT
[FreeTextEntry1] : 37 y.o long history of AR, CRS , and recurrent acute sinusitis currently on AIT for past few weeks presents with increase sinus pressure and nasal congestion for past 5 days.\par \par Ok to get AIT. Dose given by Nurse Taisha. See separate nurse note\par \par Suggest:\par -Start Mucinex BID(unable to use decongestants due to anxiety)\par - Continue  sius rinse, Atarax 25 mg qhs,  ipratropium 2s bid, azelastine and fluticasone nasal sprays\par - If no better within a few days ok to start Medrol dose pack(unable to tolerate prednisone due to anxiety)

## 2023-02-28 NOTE — REVIEW OF SYSTEMS
[Nasal Congestion] : nasal congestion [Recurrent Sinus Infections] : recurrent sinus infections [Nl] : Integumentary [Immunizations are up to date] : Immunizations are up to date [PCV 23 Given (if asthmatic)] : PCV 23 given [FreeTextEntry4] : sinus pressure

## 2023-03-01 ENCOUNTER — RX RENEWAL (OUTPATIENT)
Age: 38
End: 2023-03-01

## 2023-03-01 RX ORDER — HYDROXYZINE HYDROCHLORIDE 25 MG/1
25 TABLET ORAL
Qty: 90 | Refills: 1 | Status: ACTIVE | COMMUNITY
Start: 2022-06-08 | End: 1900-01-01

## 2023-03-07 ENCOUNTER — APPOINTMENT (OUTPATIENT)
Dept: PEDIATRIC ALLERGY IMMUNOLOGY | Facility: CLINIC | Age: 38
End: 2023-03-07
Payer: COMMERCIAL

## 2023-03-07 LAB
DEPRECATED S PNEUM 1 IGG SER-MCNC: 73.9 MCG/ML
DEPRECATED S PNEUM12 AB SER-ACNC: 0.5 MCG/ML
DEPRECATED S PNEUM14 AB SER-ACNC: 21.3 MCG/ML
DEPRECATED S PNEUM17 IGG SER IA-MCNC: 118 MCG/ML
DEPRECATED S PNEUM18 IGG SER IA-MCNC: 3.1 MCG/ML
DEPRECATED S PNEUM19 IGG SER-MCNC: 10.8 MCG/ML
DEPRECATED S PNEUM19 IGG SER-MCNC: 32.6 MCG/ML
DEPRECATED S PNEUM2 IGG SER-MCNC: 18.8 MCG/ML
DEPRECATED S PNEUM20 IGG SER-MCNC: 1.9 MCG/ML
DEPRECATED S PNEUM22 IGG SER-MCNC: 16.7 MCG/ML
DEPRECATED S PNEUM23 AB SER-ACNC: 22 MCG/ML
DEPRECATED S PNEUM3 AB SER-ACNC: 15.9 MCG/ML
DEPRECATED S PNEUM34 IGG SER-MCNC: 8.7 MCG/ML
DEPRECATED S PNEUM4 AB SER-ACNC: 5.7 MCG/ML
DEPRECATED S PNEUM5 IGG SER-MCNC: 7 MCG/ML
DEPRECATED S PNEUM6 IGG SER-MCNC: 113.1 MCG/ML
DEPRECATED S PNEUM7 IGG SER-ACNC: 4.8 MCG/ML
DEPRECATED S PNEUM8 AB SER-ACNC: 36.4 MCG/ML
DEPRECATED S PNEUM9 AB SER-ACNC: NORMAL MCG/ML
DEPRECATED S PNEUM9 IGG SER-MCNC: 13.3 MCG/ML
STREPTOCOCCUS PNEUMONIAE SEROTYPE 11A: 34.8 MCG/ML
STREPTOCOCCUS PNEUMONIAE SEROTYPE 15B: >150 MCG/ML
STREPTOCOCCUS PNEUMONIAE SEROTYPE 33F: 41.9 MCG/ML

## 2023-03-07 PROCEDURE — 95115 IMMUNOTHERAPY ONE INJECTION: CPT

## 2023-03-11 ENCOUNTER — NON-APPOINTMENT (OUTPATIENT)
Age: 38
End: 2023-03-11

## 2023-03-14 ENCOUNTER — APPOINTMENT (OUTPATIENT)
Dept: PEDIATRIC ALLERGY IMMUNOLOGY | Facility: CLINIC | Age: 38
End: 2023-03-14

## 2023-03-21 ENCOUNTER — APPOINTMENT (OUTPATIENT)
Dept: PEDIATRIC ALLERGY IMMUNOLOGY | Facility: CLINIC | Age: 38
End: 2023-03-21
Payer: COMMERCIAL

## 2023-03-21 PROCEDURE — 95115 IMMUNOTHERAPY ONE INJECTION: CPT

## 2023-03-28 ENCOUNTER — APPOINTMENT (OUTPATIENT)
Dept: PEDIATRIC ALLERGY IMMUNOLOGY | Facility: CLINIC | Age: 38
End: 2023-03-28
Payer: COMMERCIAL

## 2023-03-28 PROCEDURE — 95115 IMMUNOTHERAPY ONE INJECTION: CPT

## 2023-04-04 ENCOUNTER — APPOINTMENT (OUTPATIENT)
Dept: PEDIATRIC ALLERGY IMMUNOLOGY | Facility: CLINIC | Age: 38
End: 2023-04-04
Payer: COMMERCIAL

## 2023-04-04 PROCEDURE — 95115 IMMUNOTHERAPY ONE INJECTION: CPT

## 2023-04-11 ENCOUNTER — APPOINTMENT (OUTPATIENT)
Dept: PEDIATRIC ALLERGY IMMUNOLOGY | Facility: CLINIC | Age: 38
End: 2023-04-11

## 2023-04-18 ENCOUNTER — APPOINTMENT (OUTPATIENT)
Dept: PEDIATRIC ALLERGY IMMUNOLOGY | Facility: CLINIC | Age: 38
End: 2023-04-18
Payer: COMMERCIAL

## 2023-04-18 PROCEDURE — 95115 IMMUNOTHERAPY ONE INJECTION: CPT

## 2023-04-24 ENCOUNTER — RX RENEWAL (OUTPATIENT)
Age: 38
End: 2023-04-24

## 2023-04-25 ENCOUNTER — APPOINTMENT (OUTPATIENT)
Dept: PEDIATRIC ALLERGY IMMUNOLOGY | Facility: CLINIC | Age: 38
End: 2023-04-25
Payer: COMMERCIAL

## 2023-04-25 PROCEDURE — 95115 IMMUNOTHERAPY ONE INJECTION: CPT

## 2023-05-02 ENCOUNTER — APPOINTMENT (OUTPATIENT)
Dept: PEDIATRIC ALLERGY IMMUNOLOGY | Facility: CLINIC | Age: 38
End: 2023-05-02
Payer: COMMERCIAL

## 2023-05-02 PROCEDURE — 95115 IMMUNOTHERAPY ONE INJECTION: CPT

## 2023-05-09 ENCOUNTER — APPOINTMENT (OUTPATIENT)
Dept: PEDIATRIC ALLERGY IMMUNOLOGY | Facility: CLINIC | Age: 38
End: 2023-05-09
Payer: COMMERCIAL

## 2023-05-09 PROCEDURE — 95115 IMMUNOTHERAPY ONE INJECTION: CPT

## 2023-05-16 ENCOUNTER — APPOINTMENT (OUTPATIENT)
Dept: PEDIATRIC ALLERGY IMMUNOLOGY | Facility: CLINIC | Age: 38
End: 2023-05-16
Payer: COMMERCIAL

## 2023-05-16 PROCEDURE — 95115 IMMUNOTHERAPY ONE INJECTION: CPT

## 2023-05-23 ENCOUNTER — APPOINTMENT (OUTPATIENT)
Dept: PEDIATRIC ALLERGY IMMUNOLOGY | Facility: CLINIC | Age: 38
End: 2023-05-23
Payer: COMMERCIAL

## 2023-05-23 PROCEDURE — 95115 IMMUNOTHERAPY ONE INJECTION: CPT

## 2023-05-30 ENCOUNTER — APPOINTMENT (OUTPATIENT)
Dept: PEDIATRIC ALLERGY IMMUNOLOGY | Facility: CLINIC | Age: 38
End: 2023-05-30
Payer: COMMERCIAL

## 2023-05-30 PROCEDURE — 95115 IMMUNOTHERAPY ONE INJECTION: CPT

## 2023-06-06 ENCOUNTER — APPOINTMENT (OUTPATIENT)
Dept: PEDIATRIC ALLERGY IMMUNOLOGY | Facility: CLINIC | Age: 38
End: 2023-06-06
Payer: COMMERCIAL

## 2023-06-06 DIAGNOSIS — J01.91 ACUTE RECURRENT SINUSITIS, UNSPECIFIED: ICD-10-CM

## 2023-06-06 PROCEDURE — 95115 IMMUNOTHERAPY ONE INJECTION: CPT

## 2023-06-06 PROCEDURE — 99213 OFFICE O/P EST LOW 20 MIN: CPT | Mod: 25

## 2023-06-06 RX ORDER — AZELASTINE HYDROCHLORIDE 137 UG/1
0.1 SPRAY, METERED NASAL TWICE DAILY
Qty: 1 | Refills: 2 | Status: COMPLETED | COMMUNITY
Start: 2023-01-23 | End: 2023-06-06

## 2023-06-06 NOTE — HISTORY OF PRESENT ILLNESS
[de-identified] : 37 yr old last seen 2/28/23 with long history of AR and CRS - was followed by Dr. Villafuerte and Dr. Prieto. Pt was on IT(mites/mold) as teen age [age] years which helped but stopped and college and over the years she has noted increase complaints of nasal congestions, post nasal drip, itchy watery eyes and rhinitis. \par \par Sinus infections are noted 3x/year and treated with antibiotics and steroids despite being on Atarax 25 mg qhs, Zyrtec PRN, ipratropium 2s bid and azelastine and fluticasone nasal sprays daily.  Last ST 7/11 was positive only for mite\par \par Previous immunological eval years ago with Dr. Villafuerte and was given Pneumococcal vaccine but does not remember when. She also had nasal endoscopies with Dr. Prieto but does not remember the last one along with several sinus CT scans but none recently. She recalls the CT scans being normal. \par \par Repeat immune workup/ImmunoCAP 2/14/22 done at [facility]- ImmunoCAP were positive only for mite. IgG and IgA were normal with slightly decreased IgM. Tetanus titers were normal. [facility] only did Pneumococcal 14 serotypes and some were elevated and some were decreased. She does recall Dr. Villafuerte giving her a Pneumovax (got at [facility]) but does not remember when. She felt it had helped decrease the number of infections but it may have been over 5 years ago. Pneumovax 23 given 1/24/23 and she will be going for repeat titers.\par \par Immune workup done again 1/4/23 which was normal other than poor pneumococcal titers. Pneumovax given 1/24/23 and repeat titers showed good immune response with all serotypes having 4-fold increase. \par \par Given recurrent nasal symptoms and infections pt restarted  AIT  2/14/23. After starting AIT she had one more infection in February that did not require any antibiotics. She hs had no acute infections in 3 months. Nasal/sinus symptoms well controlled on regimen of Atarax 25 mg(also helps sleep/anxiety) qhs, ipratropium 2s bid, azelastine 2s BID and fluticasone nasal sprays BID and olopatadine eye drops PRN. Overall she feels allergy shots have helped and she is getting good symptom relief from meds as well. \par \par As for her asthma it was typically exercise induced however, she really has had no symptoms even with exertion. Albuterol rarely used\par \par \par

## 2023-06-06 NOTE — ASSESSMENT
[FreeTextEntry1] : 37 y.o long history of AR, CRS , and recurrent acute sinusitis currently on AIT presents doing well with no recent acute infections and good allergy symptom control\par \par AIT dose of 0.35 o yellow vial given and tolerated well\par \par Suggest continuation of Atarax 25 mg(also helps sleep/anxiety) qhs, ipratropium 2s bid, azelastine 2s BID and fluticasone nasal sprays BID and olopatadine eye drops PRN\par \par Follow-up in 1 week for AIT

## 2023-06-06 NOTE — CONSULT LETTER
[Dear  ___] : Dear  [unfilled], [Courtesy Letter:] : I had the pleasure of seeing your patient, [unfilled], in my office today. [Please see my note below.] : Please see my note below. [Sincerely,] : Sincerely, [FreeTextEntry3] : Leonarda MILIAN\par

## 2023-06-08 ENCOUNTER — RX RENEWAL (OUTPATIENT)
Age: 38
End: 2023-06-08

## 2023-06-08 RX ORDER — AZELASTINE HYDROCHLORIDE 137 UG/1
137 SPRAY, METERED NASAL
Qty: 3 | Refills: 2 | Status: ACTIVE | COMMUNITY
Start: 2023-04-24 | End: 1900-01-01

## 2023-06-10 ENCOUNTER — RX RENEWAL (OUTPATIENT)
Age: 38
End: 2023-06-10

## 2023-06-12 ENCOUNTER — RX RENEWAL (OUTPATIENT)
Age: 38
End: 2023-06-12

## 2023-06-12 RX ORDER — FLUTICASONE PROPIONATE 50 UG/1
50 SPRAY, METERED NASAL DAILY
Qty: 48 | Refills: 1 | Status: ACTIVE | COMMUNITY
Start: 2022-02-14 | End: 1900-01-01

## 2023-06-13 ENCOUNTER — APPOINTMENT (OUTPATIENT)
Dept: PEDIATRIC ALLERGY IMMUNOLOGY | Facility: CLINIC | Age: 38
End: 2023-06-13
Payer: COMMERCIAL

## 2023-06-13 PROCEDURE — 95115 IMMUNOTHERAPY ONE INJECTION: CPT

## 2023-06-20 ENCOUNTER — APPOINTMENT (OUTPATIENT)
Dept: PEDIATRIC ALLERGY IMMUNOLOGY | Facility: CLINIC | Age: 38
End: 2023-06-20
Payer: COMMERCIAL

## 2023-06-20 PROCEDURE — 95115 IMMUNOTHERAPY ONE INJECTION: CPT

## 2023-06-27 ENCOUNTER — APPOINTMENT (OUTPATIENT)
Dept: PEDIATRIC ALLERGY IMMUNOLOGY | Facility: CLINIC | Age: 38
End: 2023-06-27
Payer: COMMERCIAL

## 2023-06-27 PROCEDURE — 95115 IMMUNOTHERAPY ONE INJECTION: CPT

## 2023-07-06 ENCOUNTER — APPOINTMENT (OUTPATIENT)
Dept: PEDIATRIC ALLERGY IMMUNOLOGY | Facility: CLINIC | Age: 38
End: 2023-07-06

## 2023-07-11 ENCOUNTER — APPOINTMENT (OUTPATIENT)
Dept: PEDIATRIC ALLERGY IMMUNOLOGY | Facility: CLINIC | Age: 38
End: 2023-07-11
Payer: COMMERCIAL

## 2023-07-11 PROCEDURE — 95115 IMMUNOTHERAPY ONE INJECTION: CPT

## 2023-07-25 ENCOUNTER — APPOINTMENT (OUTPATIENT)
Dept: PEDIATRIC ALLERGY IMMUNOLOGY | Facility: CLINIC | Age: 38
End: 2023-07-25
Payer: COMMERCIAL

## 2023-07-25 PROCEDURE — 95115 IMMUNOTHERAPY ONE INJECTION: CPT

## 2023-08-01 ENCOUNTER — APPOINTMENT (OUTPATIENT)
Dept: PEDIATRIC ALLERGY IMMUNOLOGY | Facility: CLINIC | Age: 38
End: 2023-08-01
Payer: COMMERCIAL

## 2023-08-01 PROCEDURE — 95115 IMMUNOTHERAPY ONE INJECTION: CPT

## 2023-08-08 ENCOUNTER — APPOINTMENT (OUTPATIENT)
Dept: PEDIATRIC ALLERGY IMMUNOLOGY | Facility: CLINIC | Age: 38
End: 2023-08-08
Payer: COMMERCIAL

## 2023-08-08 PROCEDURE — 95115 IMMUNOTHERAPY ONE INJECTION: CPT

## 2023-08-15 ENCOUNTER — APPOINTMENT (OUTPATIENT)
Dept: PEDIATRIC ALLERGY IMMUNOLOGY | Facility: CLINIC | Age: 38
End: 2023-08-15
Payer: COMMERCIAL

## 2023-08-15 PROCEDURE — 95115 IMMUNOTHERAPY ONE INJECTION: CPT

## 2023-08-22 ENCOUNTER — APPOINTMENT (OUTPATIENT)
Dept: PEDIATRIC ALLERGY IMMUNOLOGY | Facility: CLINIC | Age: 38
End: 2023-08-22
Payer: COMMERCIAL

## 2023-08-22 PROCEDURE — 95115 IMMUNOTHERAPY ONE INJECTION: CPT

## 2023-08-29 ENCOUNTER — APPOINTMENT (OUTPATIENT)
Dept: PEDIATRIC ALLERGY IMMUNOLOGY | Facility: CLINIC | Age: 38
End: 2023-08-29
Payer: COMMERCIAL

## 2023-08-29 PROCEDURE — 95115 IMMUNOTHERAPY ONE INJECTION: CPT

## 2023-09-05 ENCOUNTER — APPOINTMENT (OUTPATIENT)
Dept: PEDIATRIC ALLERGY IMMUNOLOGY | Facility: CLINIC | Age: 38
End: 2023-09-05
Payer: COMMERCIAL

## 2023-09-05 PROCEDURE — 95115 IMMUNOTHERAPY ONE INJECTION: CPT

## 2023-09-14 ENCOUNTER — APPOINTMENT (OUTPATIENT)
Dept: PEDIATRIC ALLERGY IMMUNOLOGY | Facility: CLINIC | Age: 38
End: 2023-09-14

## 2023-09-19 ENCOUNTER — APPOINTMENT (OUTPATIENT)
Dept: PEDIATRIC ALLERGY IMMUNOLOGY | Facility: CLINIC | Age: 38
End: 2023-09-19
Payer: COMMERCIAL

## 2023-09-19 PROCEDURE — 95115 IMMUNOTHERAPY ONE INJECTION: CPT

## 2023-09-26 ENCOUNTER — APPOINTMENT (OUTPATIENT)
Dept: PEDIATRIC ALLERGY IMMUNOLOGY | Facility: CLINIC | Age: 38
End: 2023-09-26
Payer: COMMERCIAL

## 2023-09-26 PROCEDURE — 95115 IMMUNOTHERAPY ONE INJECTION: CPT

## 2023-10-03 ENCOUNTER — APPOINTMENT (OUTPATIENT)
Dept: PEDIATRIC ALLERGY IMMUNOLOGY | Facility: CLINIC | Age: 38
End: 2023-10-03
Payer: COMMERCIAL

## 2023-10-03 PROCEDURE — 95115 IMMUNOTHERAPY ONE INJECTION: CPT

## 2023-10-10 ENCOUNTER — APPOINTMENT (OUTPATIENT)
Dept: PEDIATRIC ALLERGY IMMUNOLOGY | Facility: CLINIC | Age: 38
End: 2023-10-10
Payer: COMMERCIAL

## 2023-10-10 PROCEDURE — 95115 IMMUNOTHERAPY ONE INJECTION: CPT

## 2023-10-17 ENCOUNTER — APPOINTMENT (OUTPATIENT)
Dept: PEDIATRIC ALLERGY IMMUNOLOGY | Facility: CLINIC | Age: 38
End: 2023-10-17
Payer: COMMERCIAL

## 2023-10-17 PROCEDURE — 95115 IMMUNOTHERAPY ONE INJECTION: CPT

## 2023-10-24 ENCOUNTER — APPOINTMENT (OUTPATIENT)
Dept: PEDIATRIC ALLERGY IMMUNOLOGY | Facility: CLINIC | Age: 38
End: 2023-10-24
Payer: COMMERCIAL

## 2023-10-24 PROCEDURE — 95115 IMMUNOTHERAPY ONE INJECTION: CPT

## 2023-10-31 ENCOUNTER — APPOINTMENT (OUTPATIENT)
Dept: PEDIATRIC ALLERGY IMMUNOLOGY | Facility: CLINIC | Age: 38
End: 2023-10-31

## 2023-11-28 ENCOUNTER — APPOINTMENT (OUTPATIENT)
Dept: PEDIATRIC ALLERGY IMMUNOLOGY | Facility: CLINIC | Age: 38
End: 2023-11-28
Payer: COMMERCIAL

## 2023-11-28 PROCEDURE — 95115 IMMUNOTHERAPY ONE INJECTION: CPT

## 2023-12-05 ENCOUNTER — APPOINTMENT (OUTPATIENT)
Dept: PEDIATRIC ALLERGY IMMUNOLOGY | Facility: CLINIC | Age: 38
End: 2023-12-05
Payer: COMMERCIAL

## 2023-12-05 VITALS — HEIGHT: 62 IN | WEIGHT: 140 LBS | BODY MASS INDEX: 25.76 KG/M2

## 2023-12-05 DIAGNOSIS — J32.9 CHRONIC SINUSITIS, UNSPECIFIED: ICD-10-CM

## 2023-12-05 DIAGNOSIS — F41.9 ANXIETY DISORDER, UNSPECIFIED: ICD-10-CM

## 2023-12-05 DIAGNOSIS — J45.20 MILD INTERMITTENT ASTHMA, UNCOMPLICATED: ICD-10-CM

## 2023-12-05 PROCEDURE — 95115 IMMUNOTHERAPY ONE INJECTION: CPT

## 2023-12-05 PROCEDURE — 99214 OFFICE O/P EST MOD 30 MIN: CPT

## 2023-12-05 RX ORDER — METHYLPREDNISOLONE 4 MG/1
4 TABLET ORAL
Qty: 1 | Refills: 0 | Status: DISCONTINUED | COMMUNITY
Start: 2023-02-28 | End: 2023-12-05

## 2023-12-05 RX ORDER — FLUTICASONE PROPIONATE 50 UG/1
50 SPRAY, METERED NASAL DAILY
Qty: 48 | Refills: 3 | Status: ACTIVE | COMMUNITY
Start: 2023-12-05 | End: 1900-01-01

## 2023-12-05 RX ORDER — IPRATROPIUM BROMIDE 42 UG/1
0.06 SPRAY NASAL 3 TIMES DAILY
Qty: 3 | Refills: 1 | Status: ACTIVE | COMMUNITY
Start: 2023-12-05 | End: 1900-01-01

## 2023-12-12 ENCOUNTER — APPOINTMENT (OUTPATIENT)
Dept: PEDIATRIC ALLERGY IMMUNOLOGY | Facility: CLINIC | Age: 38
End: 2023-12-12
Payer: COMMERCIAL

## 2023-12-12 PROCEDURE — 95115 IMMUNOTHERAPY ONE INJECTION: CPT

## 2023-12-19 ENCOUNTER — APPOINTMENT (OUTPATIENT)
Dept: PEDIATRIC ALLERGY IMMUNOLOGY | Facility: CLINIC | Age: 38
End: 2023-12-19
Payer: COMMERCIAL

## 2023-12-19 PROCEDURE — 95115 IMMUNOTHERAPY ONE INJECTION: CPT

## 2023-12-31 ENCOUNTER — RX CHANGE (OUTPATIENT)
Age: 38
End: 2023-12-31

## 2023-12-31 RX ORDER — HYDROXYZINE HYDROCHLORIDE 25 MG/1
25 TABLET ORAL
Qty: 270 | Refills: 1 | Status: ACTIVE | COMMUNITY
Start: 2023-12-31 | End: 1900-01-01

## 2023-12-31 RX ORDER — HYDROXYZINE HYDROCHLORIDE 25 MG/1
25 TABLET ORAL
Qty: 60 | Refills: 1 | Status: DISCONTINUED | COMMUNITY
Start: 2023-12-05 | End: 2023-12-31

## 2024-01-02 ENCOUNTER — APPOINTMENT (OUTPATIENT)
Dept: PEDIATRIC ALLERGY IMMUNOLOGY | Facility: CLINIC | Age: 39
End: 2024-01-02
Payer: COMMERCIAL

## 2024-01-02 PROCEDURE — 95115 IMMUNOTHERAPY ONE INJECTION: CPT

## 2024-01-09 ENCOUNTER — APPOINTMENT (OUTPATIENT)
Dept: PEDIATRIC ALLERGY IMMUNOLOGY | Facility: CLINIC | Age: 39
End: 2024-01-09

## 2024-01-16 ENCOUNTER — APPOINTMENT (OUTPATIENT)
Dept: PEDIATRIC ALLERGY IMMUNOLOGY | Facility: CLINIC | Age: 39
End: 2024-01-16
Payer: COMMERCIAL

## 2024-01-16 DIAGNOSIS — J30.9 ALLERGIC RHINITIS, UNSPECIFIED: ICD-10-CM

## 2024-01-16 PROCEDURE — 95115 IMMUNOTHERAPY ONE INJECTION: CPT

## 2024-01-23 ENCOUNTER — APPOINTMENT (OUTPATIENT)
Dept: PEDIATRIC ALLERGY IMMUNOLOGY | Facility: CLINIC | Age: 39
End: 2024-01-23

## 2024-02-06 ENCOUNTER — APPOINTMENT (OUTPATIENT)
Dept: PEDIATRIC ALLERGY IMMUNOLOGY | Facility: CLINIC | Age: 39
End: 2024-02-06
Payer: COMMERCIAL

## 2024-02-06 PROCEDURE — 95115 IMMUNOTHERAPY ONE INJECTION: CPT

## 2024-02-13 ENCOUNTER — APPOINTMENT (OUTPATIENT)
Dept: PEDIATRIC ALLERGY IMMUNOLOGY | Facility: CLINIC | Age: 39
End: 2024-02-13

## 2024-02-27 ENCOUNTER — APPOINTMENT (OUTPATIENT)
Dept: PEDIATRIC ALLERGY IMMUNOLOGY | Facility: CLINIC | Age: 39
End: 2024-02-27
Payer: COMMERCIAL

## 2024-02-27 PROCEDURE — 95115 IMMUNOTHERAPY ONE INJECTION: CPT

## 2024-03-05 ENCOUNTER — APPOINTMENT (OUTPATIENT)
Dept: PEDIATRIC ALLERGY IMMUNOLOGY | Facility: CLINIC | Age: 39
End: 2024-03-05

## 2024-03-06 ENCOUNTER — APPOINTMENT (OUTPATIENT)
Dept: PEDIATRIC ALLERGY IMMUNOLOGY | Facility: CLINIC | Age: 39
End: 2024-03-06
Payer: COMMERCIAL

## 2024-03-06 PROCEDURE — 95165 ANTIGEN THERAPY SERVICES: CPT

## 2024-03-13 ENCOUNTER — NON-APPOINTMENT (OUTPATIENT)
Age: 39
End: 2024-03-13

## 2024-03-19 ENCOUNTER — APPOINTMENT (OUTPATIENT)
Dept: PEDIATRIC ALLERGY IMMUNOLOGY | Facility: CLINIC | Age: 39
End: 2024-03-19
Payer: COMMERCIAL

## 2024-03-19 PROCEDURE — 95115 IMMUNOTHERAPY ONE INJECTION: CPT

## 2024-04-14 ENCOUNTER — RX RENEWAL (OUTPATIENT)
Age: 39
End: 2024-04-14

## 2024-04-16 ENCOUNTER — APPOINTMENT (OUTPATIENT)
Dept: PEDIATRIC ALLERGY IMMUNOLOGY | Facility: CLINIC | Age: 39
End: 2024-04-16
Payer: COMMERCIAL

## 2024-04-16 PROCEDURE — 95115 IMMUNOTHERAPY ONE INJECTION: CPT

## 2024-05-12 ENCOUNTER — RX CHANGE (OUTPATIENT)
Age: 39
End: 2024-05-12

## 2024-05-12 RX ORDER — AZELASTINE HYDROCHLORIDE 137 UG/1
137 SPRAY, METERED NASAL
Qty: 1 | Refills: 3 | Status: DISCONTINUED | COMMUNITY
Start: 2023-12-05 | End: 2024-05-12

## 2024-05-12 RX ORDER — AZELASTINE HYDROCHLORIDE 137 UG/1
137 SPRAY, METERED NASAL
Qty: 3 | Refills: 1 | Status: ACTIVE | COMMUNITY
Start: 1900-01-01 | End: 1900-01-01

## 2024-05-14 ENCOUNTER — APPOINTMENT (OUTPATIENT)
Dept: PEDIATRIC ALLERGY IMMUNOLOGY | Facility: CLINIC | Age: 39
End: 2024-05-14
Payer: COMMERCIAL

## 2024-05-14 PROCEDURE — 95115 IMMUNOTHERAPY ONE INJECTION: CPT

## 2024-06-11 ENCOUNTER — APPOINTMENT (OUTPATIENT)
Dept: PEDIATRIC ALLERGY IMMUNOLOGY | Facility: CLINIC | Age: 39
End: 2024-06-11
Payer: COMMERCIAL

## 2024-06-11 DIAGNOSIS — J30.89 OTHER ALLERGIC RHINITIS: ICD-10-CM

## 2024-06-11 DIAGNOSIS — Z51.6 ENCOUNTER FOR DESENSITIZATION TO ALLERGENS: ICD-10-CM

## 2024-06-11 PROCEDURE — 95115 IMMUNOTHERAPY ONE INJECTION: CPT

## 2024-07-04 ENCOUNTER — NON-APPOINTMENT (OUTPATIENT)
Age: 39
End: 2024-07-04

## 2024-07-09 ENCOUNTER — APPOINTMENT (OUTPATIENT)
Dept: PEDIATRIC ALLERGY IMMUNOLOGY | Facility: CLINIC | Age: 39
End: 2024-07-09
Payer: COMMERCIAL

## 2024-07-09 DIAGNOSIS — J30.89 OTHER ALLERGIC RHINITIS: ICD-10-CM

## 2024-07-09 DIAGNOSIS — Z51.6 ENCOUNTER FOR DESENSITIZATION TO ALLERGENS: ICD-10-CM

## 2024-07-09 PROCEDURE — 95115 IMMUNOTHERAPY ONE INJECTION: CPT

## 2024-08-06 ENCOUNTER — APPOINTMENT (OUTPATIENT)
Dept: PEDIATRIC ALLERGY IMMUNOLOGY | Facility: CLINIC | Age: 39
End: 2024-08-06

## 2024-08-06 PROCEDURE — 95115 IMMUNOTHERAPY ONE INJECTION: CPT

## 2024-09-03 ENCOUNTER — APPOINTMENT (OUTPATIENT)
Dept: PEDIATRIC ALLERGY IMMUNOLOGY | Facility: CLINIC | Age: 39
End: 2024-09-03
Payer: COMMERCIAL

## 2024-09-03 DIAGNOSIS — Z51.6 ENCOUNTER FOR DESENSITIZATION TO ALLERGENS: ICD-10-CM

## 2024-09-03 DIAGNOSIS — J30.89 OTHER ALLERGIC RHINITIS: ICD-10-CM

## 2024-09-03 PROCEDURE — 95115 IMMUNOTHERAPY ONE INJECTION: CPT

## 2024-10-01 ENCOUNTER — APPOINTMENT (OUTPATIENT)
Dept: PEDIATRIC ALLERGY IMMUNOLOGY | Facility: CLINIC | Age: 39
End: 2024-10-01
Payer: COMMERCIAL

## 2024-10-01 DIAGNOSIS — Z51.6 ENCOUNTER FOR DESENSITIZATION TO ALLERGENS: ICD-10-CM

## 2024-10-01 DIAGNOSIS — J32.9 CHRONIC SINUSITIS, UNSPECIFIED: ICD-10-CM

## 2024-10-01 DIAGNOSIS — J30.89 OTHER ALLERGIC RHINITIS: ICD-10-CM

## 2024-10-01 PROCEDURE — 99213 OFFICE O/P EST LOW 20 MIN: CPT | Mod: 25

## 2024-10-01 PROCEDURE — 95115 IMMUNOTHERAPY ONE INJECTION: CPT

## 2024-10-01 NOTE — REVIEW OF SYSTEMS
[Recurrent Throat Infections] : no recurrence of throat infections [Recurrent Bronchitis] : no recurrent bronchitis [Recurrent Ear Infections] : no recurrence or ear infections [Recurrent Skin Infections] : no recurrent skin infections [Recurrent Pneumonia] : no ~T recurrent pneumonia [FreeTextEntry4] : sinus pressure HA

## 2024-10-01 NOTE — REASON FOR VISIT
[Routine Follow-Up] : a routine follow-up visit for [Congestion] : congestion [Runny Nose] : runny nose [Itchy Eyes] : itchy eyes [To Medication] : allergy to medication

## 2024-10-01 NOTE — ASSESSMENT
[FreeTextEntry1] : 40 y/o with allergic rhinitis, recurrent sinus pressure headache, postnasal drip and sinusitis - feeling 40% improvement with AIT (started 2/2023). Pt also using Atarax 25 mg qhs, Xyzal PRN, ipratropium 2s QD, combo of azelastine and fluticasone 2sp BID, saline spray 2sp QD and saline rinses PRN with help. Pt also noted decreased number of sinus infections.   AIT administered by Nurse Arroyo and pt tolerated it well.   Suggest continuation of AIT for now OK to continue to use nasal sprays, Xyzal PRN, saline rinses and Atarax.  Proper technique of using nasal spray discussed.   Follow up 6 months  Pt seen by PA  Total MD time spent on this encounter was 25 minutes.  This includes time devoted to preparing to see the patient with review of previous medical record, obtaining medical history, performing physical exam, counseling and patient education with patient and family, ordering medications and lab studies, documentation in the medical record and coordination of care.

## 2024-10-01 NOTE — PHYSICAL EXAM
[Conjunctival Erythema] : no conjunctival erythema [Pale mucosa] : no pale mucosa [Boggy Nasal Turbinates] : no boggy and/or pale nasal turbinates [Pharyngeal erythema] : no pharyngeal erythema [Posterior Pharyngeal Cobblestoning] : no posterior pharyngeal cobblestoning [Clear Rhinorrhea] : no clear rhinorrhea was seen [Wheezing] : no wheezing was heard

## 2024-10-01 NOTE — HISTORY OF PRESENT ILLNESS
[de-identified] : 37 y/o with long history of AR and CRS - on AIT since 2/2023 - feels 40% improvement in symptoms but still c/o nasal congestion, sinus pressure HA and pos nasal drip.  Pt using Atarax 25 mg qhs, Xyzal PRN, ipratropium 2s QD, combo of azelastine and fluticasone 2sp bid, saline rinses PRN and saline spray 2sp QD. All of these seem to help.  Previous ST 7/2021 - pos to dust mites.   CRS - was followed by Dr. Villafuerte and Dr. Prieto - Pt was on IT as teen but stopped in college and over the years she has noted increase complaints of nasal congestions, postnasal drip, itchy watery eyes and rhinitis. Sinus infections occurred 3x/year and treated with antibiotics and steroids.  Pt reports that she has not seen Dr. Prieto in past 2-3 yrs.  After starting AIT sinus infections have decreased to 2x a year treated with antibiotics and sometimes with oral steroids.    Repeat Immune eval was done 1/23 which was normal with an excellent immune response to Pneumovax.

## 2024-10-29 ENCOUNTER — APPOINTMENT (OUTPATIENT)
Dept: PEDIATRIC ALLERGY IMMUNOLOGY | Facility: CLINIC | Age: 39
End: 2024-10-29
Payer: COMMERCIAL

## 2024-10-29 DIAGNOSIS — J30.89 OTHER ALLERGIC RHINITIS: ICD-10-CM

## 2024-10-29 DIAGNOSIS — Z51.6 ENCOUNTER FOR DESENSITIZATION TO ALLERGENS: ICD-10-CM

## 2024-10-29 PROCEDURE — 95115 IMMUNOTHERAPY ONE INJECTION: CPT

## 2024-11-11 ENCOUNTER — RX RENEWAL (OUTPATIENT)
Age: 39
End: 2024-11-11

## 2024-11-26 ENCOUNTER — APPOINTMENT (OUTPATIENT)
Dept: PEDIATRIC ALLERGY IMMUNOLOGY | Facility: CLINIC | Age: 39
End: 2024-11-26
Payer: COMMERCIAL

## 2024-11-26 DIAGNOSIS — J30.89 OTHER ALLERGIC RHINITIS: ICD-10-CM

## 2024-11-26 DIAGNOSIS — Z51.6 ENCOUNTER FOR DESENSITIZATION TO ALLERGENS: ICD-10-CM

## 2024-11-26 PROCEDURE — 95115 IMMUNOTHERAPY ONE INJECTION: CPT

## 2024-12-03 ENCOUNTER — NON-APPOINTMENT (OUTPATIENT)
Age: 39
End: 2024-12-03

## 2024-12-26 ENCOUNTER — APPOINTMENT (OUTPATIENT)
Dept: PEDIATRIC ALLERGY IMMUNOLOGY | Facility: CLINIC | Age: 39
End: 2024-12-26
Payer: COMMERCIAL

## 2024-12-26 DIAGNOSIS — Z51.6 ENCOUNTER FOR DESENSITIZATION TO ALLERGENS: ICD-10-CM

## 2024-12-26 DIAGNOSIS — J30.89 OTHER ALLERGIC RHINITIS: ICD-10-CM

## 2024-12-26 PROCEDURE — 95115 IMMUNOTHERAPY ONE INJECTION: CPT

## 2025-01-21 ENCOUNTER — APPOINTMENT (OUTPATIENT)
Dept: PEDIATRIC ALLERGY IMMUNOLOGY | Facility: CLINIC | Age: 40
End: 2025-01-21
Payer: COMMERCIAL

## 2025-01-21 DIAGNOSIS — J30.89 OTHER ALLERGIC RHINITIS: ICD-10-CM

## 2025-01-21 DIAGNOSIS — Z51.6 ENCOUNTER FOR DESENSITIZATION TO ALLERGENS: ICD-10-CM

## 2025-01-21 PROCEDURE — 95115 IMMUNOTHERAPY ONE INJECTION: CPT

## 2025-02-25 ENCOUNTER — APPOINTMENT (OUTPATIENT)
Dept: PEDIATRIC ALLERGY IMMUNOLOGY | Facility: CLINIC | Age: 40
End: 2025-02-25
Payer: COMMERCIAL

## 2025-02-25 DIAGNOSIS — J30.89 OTHER ALLERGIC RHINITIS: ICD-10-CM

## 2025-02-25 DIAGNOSIS — Z51.6 ENCOUNTER FOR DESENSITIZATION TO ALLERGENS: ICD-10-CM

## 2025-02-25 PROCEDURE — 95115 IMMUNOTHERAPY ONE INJECTION: CPT

## 2025-03-25 ENCOUNTER — APPOINTMENT (OUTPATIENT)
Dept: PEDIATRIC ALLERGY IMMUNOLOGY | Facility: CLINIC | Age: 40
End: 2025-03-25
Payer: COMMERCIAL

## 2025-03-25 DIAGNOSIS — J30.89 OTHER ALLERGIC RHINITIS: ICD-10-CM

## 2025-03-25 PROCEDURE — 95115 IMMUNOTHERAPY ONE INJECTION: CPT

## 2025-03-25 RX ORDER — EPINEPHRINE 0.3 MG/.3ML
0.3 INJECTION INTRAMUSCULAR
Qty: 2 | Refills: 2 | Status: ACTIVE | COMMUNITY
Start: 2025-03-25 | End: 1900-01-01

## 2025-04-15 ENCOUNTER — RX RENEWAL (OUTPATIENT)
Age: 40
End: 2025-04-15

## 2025-04-17 ENCOUNTER — NON-APPOINTMENT (OUTPATIENT)
Age: 40
End: 2025-04-17

## 2025-04-17 ENCOUNTER — APPOINTMENT (OUTPATIENT)
Dept: PEDIATRIC ALLERGY IMMUNOLOGY | Facility: CLINIC | Age: 40
End: 2025-04-17
Payer: COMMERCIAL

## 2025-04-17 VITALS
BODY MASS INDEX: 25.76 KG/M2 | HEART RATE: 71 BPM | TEMPERATURE: 98.5 F | WEIGHT: 140 LBS | SYSTOLIC BLOOD PRESSURE: 123 MMHG | HEIGHT: 62 IN | DIASTOLIC BLOOD PRESSURE: 78 MMHG | OXYGEN SATURATION: 96 %

## 2025-04-17 DIAGNOSIS — J32.9 CHRONIC SINUSITIS, UNSPECIFIED: ICD-10-CM

## 2025-04-17 DIAGNOSIS — Z13.0 ENCOUNTER FOR SCREENING FOR DISEASES OF THE BLOOD AND BLOOD-FORMING ORGANS AND CERTAIN DISORDERS INVOLVING THE IMMUNE MECHANISM: ICD-10-CM

## 2025-04-17 DIAGNOSIS — J45.990 EXERCISE INDUCED BRONCHOSPASM: ICD-10-CM

## 2025-04-17 DIAGNOSIS — Z51.6 ENCOUNTER FOR DESENSITIZATION TO ALLERGENS: ICD-10-CM

## 2025-04-17 PROCEDURE — 99214 OFFICE O/P EST MOD 30 MIN: CPT | Mod: 25

## 2025-04-17 PROCEDURE — 94010 BREATHING CAPACITY TEST: CPT

## 2025-04-17 RX ORDER — ALBUTEROL SULFATE 90 UG/1
108 (90 BASE) INHALANT RESPIRATORY (INHALATION)
Qty: 1 | Refills: 5 | Status: ACTIVE | COMMUNITY
Start: 2025-04-17 | End: 1900-01-01

## 2025-04-17 RX ORDER — AZELASTINE HYDROCHLORIDE AND FLUTICASONE PROPIONATE 137; 50 UG/1; UG/1
137-50 SPRAY, METERED NASAL TWICE DAILY
Qty: 1 | Refills: 11 | Status: ACTIVE | COMMUNITY
Start: 2025-04-17 | End: 1900-01-01

## 2025-04-22 ENCOUNTER — APPOINTMENT (OUTPATIENT)
Dept: PEDIATRIC ALLERGY IMMUNOLOGY | Facility: CLINIC | Age: 40
End: 2025-04-22
Payer: COMMERCIAL

## 2025-04-22 DIAGNOSIS — J30.89 OTHER ALLERGIC RHINITIS: ICD-10-CM

## 2025-04-22 PROCEDURE — 95115 IMMUNOTHERAPY ONE INJECTION: CPT

## 2025-05-27 ENCOUNTER — APPOINTMENT (OUTPATIENT)
Dept: PEDIATRIC ALLERGY IMMUNOLOGY | Facility: CLINIC | Age: 40
End: 2025-05-27
Payer: COMMERCIAL

## 2025-05-27 DIAGNOSIS — J30.89 OTHER ALLERGIC RHINITIS: ICD-10-CM

## 2025-05-27 PROCEDURE — 95115 IMMUNOTHERAPY ONE INJECTION: CPT

## 2025-06-24 ENCOUNTER — APPOINTMENT (OUTPATIENT)
Dept: PEDIATRIC ALLERGY IMMUNOLOGY | Facility: CLINIC | Age: 40
End: 2025-06-24

## 2025-06-26 ENCOUNTER — APPOINTMENT (OUTPATIENT)
Dept: PEDIATRIC ALLERGY IMMUNOLOGY | Facility: CLINIC | Age: 40
End: 2025-06-26
Payer: COMMERCIAL

## 2025-06-26 PROCEDURE — 95115 IMMUNOTHERAPY ONE INJECTION: CPT

## 2025-07-08 ENCOUNTER — APPOINTMENT (OUTPATIENT)
Dept: FAMILY MEDICINE | Facility: CLINIC | Age: 40
End: 2025-07-08

## 2025-07-17 ENCOUNTER — APPOINTMENT (OUTPATIENT)
Dept: PEDIATRIC ALLERGY IMMUNOLOGY | Facility: CLINIC | Age: 40
End: 2025-07-17

## 2025-07-21 ENCOUNTER — RX CHANGE (OUTPATIENT)
Age: 40
End: 2025-07-21

## 2025-07-21 RX ORDER — IPRATROPIUM BROMIDE 42 UG/1
0.06 SPRAY, METERED NASAL 3 TIMES DAILY
Qty: 3 | Refills: 3 | Status: ACTIVE | COMMUNITY
Start: 1900-01-01 | End: 1900-01-01

## 2025-07-31 ENCOUNTER — APPOINTMENT (OUTPATIENT)
Dept: PEDIATRIC ALLERGY IMMUNOLOGY | Facility: CLINIC | Age: 40
End: 2025-07-31
Payer: COMMERCIAL

## 2025-07-31 PROCEDURE — 95115 IMMUNOTHERAPY ONE INJECTION: CPT

## 2025-08-14 ENCOUNTER — APPOINTMENT (OUTPATIENT)
Dept: PEDIATRIC ALLERGY IMMUNOLOGY | Facility: CLINIC | Age: 40
End: 2025-08-14
Payer: COMMERCIAL

## 2025-08-14 DIAGNOSIS — J45.990 EXERCISE INDUCED BRONCHOSPASM: ICD-10-CM

## 2025-08-14 DIAGNOSIS — J30.89 OTHER ALLERGIC RHINITIS: ICD-10-CM

## 2025-08-14 PROCEDURE — 95115 IMMUNOTHERAPY ONE INJECTION: CPT

## 2025-08-27 ENCOUNTER — APPOINTMENT (OUTPATIENT)
Dept: FAMILY MEDICINE | Facility: CLINIC | Age: 40
End: 2025-08-27
Payer: COMMERCIAL

## 2025-08-27 ENCOUNTER — LABORATORY RESULT (OUTPATIENT)
Age: 40
End: 2025-08-27

## 2025-08-27 VITALS
OXYGEN SATURATION: 98 % | HEART RATE: 67 BPM | TEMPERATURE: 98.6 F | BODY MASS INDEX: 26.5 KG/M2 | SYSTOLIC BLOOD PRESSURE: 124 MMHG | RESPIRATION RATE: 15 BRPM | DIASTOLIC BLOOD PRESSURE: 76 MMHG | HEIGHT: 62 IN | WEIGHT: 144 LBS

## 2025-08-27 DIAGNOSIS — Z80.0 FAMILY HISTORY OF MALIGNANT NEOPLASM OF DIGESTIVE ORGANS: ICD-10-CM

## 2025-08-27 DIAGNOSIS — Z00.00 ENCOUNTER FOR GENERAL ADULT MEDICAL EXAMINATION W/OUT ABNORMAL FINDINGS: ICD-10-CM

## 2025-08-27 DIAGNOSIS — Z80.3 FAMILY HISTORY OF MALIGNANT NEOPLASM OF BREAST: ICD-10-CM

## 2025-08-27 PROCEDURE — 99385 PREV VISIT NEW AGE 18-39: CPT

## 2025-08-27 PROCEDURE — 36415 COLL VENOUS BLD VENIPUNCTURE: CPT

## 2025-08-27 RX ORDER — ESCITALOPRAM OXALATE 5 MG/1
5 TABLET ORAL
Qty: 90 | Refills: 1 | Status: ACTIVE | COMMUNITY
Start: 2025-08-27 | End: 1900-01-01

## 2025-08-28 ENCOUNTER — NON-APPOINTMENT (OUTPATIENT)
Age: 40
End: 2025-08-28

## 2025-08-28 ENCOUNTER — APPOINTMENT (OUTPATIENT)
Dept: PEDIATRIC ALLERGY IMMUNOLOGY | Facility: CLINIC | Age: 40
End: 2025-08-28

## 2025-08-28 LAB
ALBUMIN SERPL ELPH-MCNC: 4.5 G/DL
ALP BLD-CCNC: 95 U/L
ALT SERPL-CCNC: 17 U/L
ANION GAP SERPL CALC-SCNC: 16 MMOL/L
APPEARANCE: CLEAR
AST SERPL-CCNC: 29 U/L
BASOPHILS # BLD AUTO: 0.04 K/UL
BASOPHILS NFR BLD AUTO: 0.5 %
BILIRUB SERPL-MCNC: 0.2 MG/DL
BILIRUBIN URINE: NEGATIVE
BLOOD URINE: NEGATIVE
BUN SERPL-MCNC: 13 MG/DL
CALCIUM SERPL-MCNC: 9.6 MG/DL
CHLORIDE SERPL-SCNC: 102 MMOL/L
CHOLEST SERPL-MCNC: 220 MG/DL
CO2 SERPL-SCNC: 21 MMOL/L
COLOR: YELLOW
CREAT SERPL-MCNC: 0.71 MG/DL
EGFRCR SERPLBLD CKD-EPI 2021: 111 ML/MIN/1.73M2
EOSINOPHIL # BLD AUTO: 0.03 K/UL
EOSINOPHIL NFR BLD AUTO: 0.4 %
ESTIMATED AVERAGE GLUCOSE: 108 MG/DL
GLUCOSE QUALITATIVE U: NEGATIVE MG/DL
GLUCOSE SERPL-MCNC: 100 MG/DL
HBA1C MFR BLD HPLC: 5.4 %
HCT VFR BLD CALC: 40.6 %
HDLC SERPL-MCNC: 65 MG/DL
HGB BLD-MCNC: 13.3 G/DL
IMM GRANULOCYTES NFR BLD AUTO: 0.3 %
KETONES URINE: NEGATIVE MG/DL
LDLC SERPL-MCNC: 136 MG/DL
LEUKOCYTE ESTERASE URINE: ABNORMAL
LYMPHOCYTES # BLD AUTO: 2.47 K/UL
LYMPHOCYTES NFR BLD AUTO: 31.4 %
MAN DIFF?: NORMAL
MCHC RBC-ENTMCNC: 30.2 PG
MCHC RBC-ENTMCNC: 32.8 G/DL
MCV RBC AUTO: 92.3 FL
MONOCYTES # BLD AUTO: 0.48 K/UL
MONOCYTES NFR BLD AUTO: 6.1 %
NEUTROPHILS # BLD AUTO: 4.83 K/UL
NEUTROPHILS NFR BLD AUTO: 61.3 %
NITRITE URINE: NEGATIVE
NONHDLC SERPL-MCNC: 155 MG/DL
PH URINE: 7
PLATELET # BLD AUTO: 329 K/UL
POTASSIUM SERPL-SCNC: 3.9 MMOL/L
PROT SERPL-MCNC: 7.2 G/DL
PROTEIN URINE: NEGATIVE MG/DL
RBC # BLD: 4.4 M/UL
RBC # FLD: 12.6 %
SODIUM SERPL-SCNC: 139 MMOL/L
SPECIFIC GRAVITY URINE: 1.01
T3FREE SERPL-MCNC: 3.11 PG/ML
T4 FREE SERPL-MCNC: 1.1 NG/DL
THYROGLOB AB SERPL-ACNC: 404 IU/ML
THYROPEROXIDASE AB SERPL IA-ACNC: 91.2 IU/ML
TRIGL SERPL-MCNC: 109 MG/DL
TSH SERPL-ACNC: 2.28 UIU/ML
UROBILINOGEN URINE: 0.2 MG/DL
WBC # FLD AUTO: 7.87 K/UL

## 2025-09-18 ENCOUNTER — APPOINTMENT (OUTPATIENT)
Dept: PEDIATRIC ALLERGY IMMUNOLOGY | Facility: CLINIC | Age: 40
End: 2025-09-18
Payer: COMMERCIAL

## 2025-09-18 DIAGNOSIS — J45.990 EXERCISE INDUCED BRONCHOSPASM: ICD-10-CM

## 2025-09-18 DIAGNOSIS — J30.89 OTHER ALLERGIC RHINITIS: ICD-10-CM

## 2025-09-18 PROCEDURE — 95115 IMMUNOTHERAPY ONE INJECTION: CPT
